# Patient Record
Sex: FEMALE | Race: OTHER | URBAN - METROPOLITAN AREA
[De-identification: names, ages, dates, MRNs, and addresses within clinical notes are randomized per-mention and may not be internally consistent; named-entity substitution may affect disease eponyms.]

---

## 2019-11-01 ENCOUNTER — EMERGENCY (EMERGENCY)
Facility: HOSPITAL | Age: 44
LOS: 1 days | Discharge: HOME | End: 2019-11-01
Admitting: EMERGENCY MEDICINE
Payer: COMMERCIAL

## 2019-11-01 VITALS
OXYGEN SATURATION: 98 % | HEART RATE: 90 BPM | RESPIRATION RATE: 16 BRPM | DIASTOLIC BLOOD PRESSURE: 8 MMHG | HEIGHT: 59 IN | SYSTOLIC BLOOD PRESSURE: 133 MMHG | TEMPERATURE: 98 F | WEIGHT: 179.9 LBS

## 2019-11-01 VITALS
HEART RATE: 87 BPM | OXYGEN SATURATION: 97 % | DIASTOLIC BLOOD PRESSURE: 85 MMHG | SYSTOLIC BLOOD PRESSURE: 128 MMHG | TEMPERATURE: 98 F | RESPIRATION RATE: 17 BRPM

## 2019-11-01 LAB — HCG UR QL: NEGATIVE — SIGNIFICANT CHANGE UP

## 2019-11-01 PROCEDURE — 70487 CT MAXILLOFACIAL W/DYE: CPT | Mod: 26

## 2019-11-01 PROCEDURE — 99284 EMERGENCY DEPT VISIT MOD MDM: CPT

## 2019-11-01 RX ORDER — OFLOXACIN 0.3 %
1 DROPS OPHTHALMIC (EYE) ONCE
Refills: 0 | Status: COMPLETED | OUTPATIENT
Start: 2019-11-01 | End: 2019-11-01

## 2019-11-01 RX ORDER — ALPRAZOLAM 0.25 MG
1 TABLET ORAL ONCE
Refills: 0 | Status: DISCONTINUED | OUTPATIENT
Start: 2019-11-01 | End: 2019-11-01

## 2019-11-01 RX ORDER — VANCOMYCIN HCL 1 G
VIAL (EA) INTRAVENOUS
Refills: 0 | Status: DISCONTINUED | OUTPATIENT
Start: 2019-11-01 | End: 2019-11-01

## 2019-11-01 RX ORDER — VANCOMYCIN HCL 1 G
1750 VIAL (EA) INTRAVENOUS ONCE
Refills: 0 | Status: COMPLETED | OUTPATIENT
Start: 2019-11-01 | End: 2019-11-01

## 2019-11-01 RX ADMIN — Medication 250 MILLIGRAM(S): at 20:38

## 2019-11-01 RX ADMIN — Medication 1 MILLIGRAM(S): at 21:39

## 2019-11-01 NOTE — ED PROVIDER NOTE - PATIENT PORTAL LINK FT
You can access the FollowMyHealth Patient Portal offered by [facility] by registering at the following website: [email or web address]. By joining [employer]’s FollowMyHealth portal, you will also be able to view your health information using other applications (apps) compatible with our system.

## 2019-11-01 NOTE — ED PROVIDER NOTE - OBJECTIVE STATEMENT
45 y/o F with no PMHx presents to the ED for BL eye pain with associated swelling. Pt reports she put on Halloween contact lenses last night and took them off shortly after due to pain. Today, she woke up with BL eye pain described as "a feeling as if something is in the eye" with associated swelling and redness. She went to [facility] and was given Ibuprofen which has provided some relief. Pt was referred to the ED for further evaluation. She also endorses mild blurred vision, photophobia, and a mild subjective fever. Pt states she normally does not wear contact lenses.

## 2019-11-01 NOTE — ED ADULT NURSE NOTE - CHIEF COMPLAINT QUOTE
right eye is irritated after putting old contacts in last night and then took them out. eye is red and irriated.no vision changes. cheek is red too

## 2019-11-01 NOTE — ED PROVIDER NOTE - CARE PROVIDER_API CALL
Miguel May)  Ophthalmology  [address] [address]  Phone: [phone]  Fax: [phone]  Follow Up Time:

## 2019-11-01 NOTE — ED PROVIDER NOTE - PROGRESS NOTE DETAILS
Erythema, warmth and redness completely resolved with decadron and toradol + vanco. Wet read of CT shows no soft tissue infection and no orbital or periorbital cellulitis. Pt requesting to leave at [facility] st[facility]. GIven Vigamox drops. Understands the risks of leaving prior to official CT read is back- states she will come back immediately if there are any concerning findings- otherwise she will follow up with dr. spence in [place]. Will send Rx for clinda

## 2019-11-01 NOTE — ED PROVIDER NOTE - NSFOLLOWUPINSTRUCTIONS_ED_ALL_ED_FT
-PLEASE FOLLOW-UP WITH YOUR PRIMARY CARE DOCTOR IN 1-2 DAYS.  BRING ALL PAPERWORK FROM TODAY'S VISIT TO YOUR FOLLOW-UP VISIT.  IF YOU DO NOT HAVE A PRIMARY CARE DOCTOR PLEASE REFER TO THE OFFICE/CLINIC INFORMATION GIVEN ABOVE.  YOU MAY ALSO CALL [phone] AND ASK FOR MS. EPIFANIOCHANO LEON.  SHE CAN HELP YOU MAKE A FOLLOW-UP APPOINTMENT.  HER HOURS ARE 11AM-7PM MONDAY - FRIDAY.  -TAKE OVER THE COUNTER TYLENOL 650MG BY MOUTH EVERY 4-6 HOURS AS NEEDED FOR PAIN.  DO NOT MIX WITH ALCOHOL OR OTHER PRESCRIPTION MEDICATIONS THAT ALREADY CONTAIN TYLENOL OR ACETAMINOPHEN.   -TAKE OVER THE COUNTER IBUPROFEN 400-600MG BY MOUTH EVERY 8 HOURS AS NEEDED FOR PAIN.  BE SURE TO TAKE WITH FOOD OR MILK AS THIS MEDICATION CAN CAUSE STOMACH IRRITATION.  -PLEASE RETURN TO THE ER IMMEDIATELY OR CALL 911 FOR ANY HIGH FEVER, TROUBLE BREATHING, VOMITING, SEVERE PAIN, OR ANY OTHER CONCERNS.

## 2019-11-01 NOTE — ED PROVIDER NOTE - CLINICAL SUMMARY MEDICAL DECISION MAKING FREE TEXT BOX
Pt sent from  for eye redness and discomfort, photophobia, and surrounding skin with redness and warmth. R/o orbital vs periorbital cellulitis. Unable to get labs - attempted with US. Line placed. Will get CT maxillofacial with IV contrast. Will give IV Vanc. re-eval

## 2019-11-01 NOTE — ED PROVIDER NOTE - PHYSICAL EXAMINATION
VITAL SIGNS: I have reviewed nursing notes and confirm.  CONSTITUTIONAL: Well-developed; well-nourished; in no acute distress.  SKIN: Skin is warm and dry, no acute rash.  HEAD: Normocephalic; atraumatic.  EYES: PERRL, EOM intact; conjunctiva and sclera clear.  ENT: No nasal discharge; airway clear.  NECK: Supple; non tender.  CARD: S1, S2 normal; no murmurs, gallops, or rubs. Regular rate and rhythm.  RESP: No wheezes, rales or rhonchi.  ABD: Normal bowel sounds; soft; non-distended; non-tender.   EXT: Normal ROM. No clubbing, cyanosis or edema.  NEURO: Alert, oriented. Grossly unremarkable.  PSYCH: Cooperative, appropriate. VITAL SIGNS: I have reviewed nursing notes and confirm.  CONSTITUTIONAL: Well-developed; well-nourished; in no acute distress.  SKIN: Skin is warm and dry, no acute rash.  HEAD: Normocephalic; atraumatic.  Left EYE: PERRL, EOM intact; conjunctiva and sclera clear.  R Eye: Pupils are equal, round, reactive to light with extraocular movements intact. Vision is grossly intact.  funduscopic: macula present, no hemorrhage, AV nicking or cupping, optic disc shows cup disc ratio of .3, no flaring or blast cells visualized, no vitreal humor leak  PERRL. EOMI- no pain with movement. No chemosis or hyphema. On fluorescein stain, there are no abrasions or ulcerations visualized, no FB visualized, and no dendritic lesions visualized. IOP of the left eye is 8 and of the right eye is 8. No anisocoria. There are no leukocytes in the anterior chamber on slit lamp exam.  visual acuity: grossly intact, far 20/20 OS, 20/20 OD, 20/20 OU. surrounding skin is erythematous and warm. non-tender. no crepitus.   external: upper/lower lids with mild erythema/edema. NO tenderness  conjunctiva: mildly injected - no corneal abrasion visualized with fluorescein staining-pain not relieved with tetracaine  ENT: No nasal discharge; airway clear.  NECK: Supple; non tender.  CARD: S1, S2 normal; no murmurs, gallops, or rubs. Regular rate and rhythm.  RESP: No wheezes, rales or rhonchi.  ABD: Normal bowel sounds; soft; non-distended; non-tender.   EXT: Normal ROM. No clubbing, cyanosis or edema.  NEURO: Alert, oriented. Grossly unremarkable.  PSYCH: Cooperative, appropriate.

## 2019-11-01 NOTE — ED ADULT TRIAGE NOTE - CHIEF COMPLAINT QUOTE
right eye is irritated after putting old contacts in last night and then took them out. eye is red and irriated. right eye is irritated after putting old contacts in last night and then took them out. eye is red and irriated.no vision changes. cheek is red too

## 2019-11-01 NOTE — ED PROVIDER NOTE - NS ED ROS FT
Other than symptoms associated with present events the following is reported:  General:  Positive for mild subjective fever. No chills, no weight loss.  HEENT:  Positive for Eye pain, swelling, and redness.   Respiratory: No cough, no dyspnea, no wheeze.  Cardiovascular:  No chest pain, no palpitations, no orthopnea.  GI: No abdominal pain, no nausea/vomiting, no diarrhea.  : No dysuria, no frequency, no urgency.  Musculoskeletal:  No joint pain, no myalgia.  Endocrine:  No generalized weakness, no polyuria.  Neurological:  No headache, no focal weakness.   Psychiatric: No emotional stress, no depression.  Derm:  No rash.  Heme:  No bruising, no bleeding.

## 2019-11-02 RX ORDER — MOXIFLOXACIN HCL 0.5 %
2 DROPS OPHTHALMIC (EYE) ONCE
Refills: 0 | Status: COMPLETED | OUTPATIENT
Start: 2019-11-02 | End: 2019-11-02

## 2019-11-02 RX ORDER — DEXAMETHASONE 0.5 MG/5ML
8 ELIXIR ORAL ONCE
Refills: 0 | Status: COMPLETED | OUTPATIENT
Start: 2019-11-02 | End: 2019-11-02

## 2019-11-02 RX ADMIN — Medication 8 MILLIGRAM(S): at 00:17

## 2019-11-02 RX ADMIN — Medication 2 DROP(S): at 00:31

## 2019-11-07 DIAGNOSIS — L03.213 PERIORBITAL CELLULITIS: ICD-10-CM

## 2019-11-07 DIAGNOSIS — H57.89 OTHER SPECIFIED DISORDERS OF EYE AND ADNEXA: ICD-10-CM

## 2021-12-02 NOTE — ED ADULT NURSE NOTE - NS ED NOTE  TALK SOMEONE YN
Anesthesia Pre Eval Note    Anesthesia ROS/Med Hx    Overall Review:  EKG was reviewed     Anesthetic Complication History:  Patient does not have a history of anesthetic complications      End/Other Review:    Positive for cancer  Additional Results:     ALLERGIES:  No Known Allergies       Lab Results       Component                Value               Date                       WBC                      5.1                 10/28/2021                 RBC                      3.96 (L)            10/28/2021                 HGB                      12.7                10/28/2021                 HCT                      38.4                10/28/2021                 MCHC                     33.1                10/28/2021                 CREATININE               0.72                10/28/2021                 GFRESTIMATE              84                  10/28/2021                 PLT                      228                 10/28/2021             Past Medical History:  No date: Malignant neoplasm (CMS/HCC)      Comment:  BCC face  No date: Thyroid condition    Past Surgical History:  12/11/2018: Basal cell carcinoma excision; Left      Comment:  Mohs left upper back  1990's: Breast surgery; Bilateral      Comment:  Bilateral implants  10/02/2020: Colonoscopy      Comment:  Colonoscopy with polypectomy  11/06/2014: Colonoscopy      Comment:  Dr. Dreyer  2006: Eye surgery; Right      Comment:  Lasik  10/2021: Eye surgery; Left      Comment:  Cataract extraction  No date: Fracture surgery; Left      Comment:  arm  10/28/2021: Vitrectomy; Left      Comment:  Dr. Crawford - LEFT PARS PLANA VITRECTOMY, USING 23-GAUGE                INSTRUMENTS, AIR       Prior to Admission medications :  Medication amoxicillin (AMOXIL) 500 MG capsule, Sig Take 500 mg by mouth 3 times daily. Antibiotic course to be completed by 12/02/2021 (per patient)., Start Date , End Date , Taking? Yes, Authorizing Provider Outside Provider    Medication  levothyroxine 75 MCG tablet, Sig Take 75 mcg by mouth., Start Date , End Date , Taking? Yes, Authorizing Provider Outside Provider    Medication gabapentin (NEURONTIN) 300 MG capsule, Sig Take 300 mg by mouth 3 times daily., Start Date , End Date , Taking? Yes, Authorizing Provider Outside Provider    Medication cetirizine (ZyrTEC Allergy) 10 MG tablet, Sig Take 10 mg by mouth as needed for Allergies., Start Date , End Date , Taking? Yes, Authorizing Provider Outside Provider    Medication ibuprofen (MOTRIN) 200 MG tablet, Sig Take 200 mg by mouth every 6 hours as needed for Pain., Start Date , End Date , Taking? Yes, Authorizing Provider Outside Provider    Medication Aspirin-Acetaminophen-Caffeine (EXCEDRIN PO), Sig Take by mouth as needed., Start Date , End Date , Taking? Yes, Authorizing Provider Outside Provider    Medication atropine (ISOPTO ATROPINE) 1 % ophthalmic solution, Sig Place 1 drop into left eye 2 times daily., Start Date 10/28/21, End Date , Taking? , Authorizing Provider Tomas Crawford MD    Medication moxifloxacin (VIGAMOX) 0.5 % ophthalmic solution, Sig Place 1 drop into left eye 4 times daily at 8am, 12pm, 4pm, and 8pm, Start Date 10/28/21, End Date , Taking? , Authorizing Provider Tomas Crawford MD    Medication prednisoLONE acetate (PRED FORTE) 1 % ophthalmic suspension, Sig Place 1 drop into left eye 4 times daily at 8am, 12pm, 4pm, and 8pm, Start Date 10/28/21, End Date , Taking? , Authorizing Provider Tomas Crawford MD    Medication Ophthalmic Irrigation Solution (RA Sterile Eye Wash) Solution, Sig Sterile solution soaked eye pads may be used over closed lids between applications of medicated drops., Start Date 10/28/21, End Date , Taking? , Authorizing Provider Tomas Crawford MD    Medication timolol (TIMOPTIC) 0.25 % ophthalmic solution, Sig Place 1 drop into left eye 2 times daily at 8am and 8pm ., Start Date 10/28/21, End Date , Taking? , Authorizing Provider Tomas Crawford  MD    Medication ondansetron (Zofran ODT) 4 MG disintegrating tablet, Sig Place 1 tablet onto the tongue every 8 hours as needed for Nausea., Start Date 10/28/21, End Date , Taking? , Authorizing Provider Tomas Crawford MD         Patient Vitals in the past 24 hrs:  12/02/21 1044, BP:117/61, Temp:36.6 °C (97.9 °F), Temp src:Temporal, Pulse:74, Resp:18, SpO2:98 %, Height:5' 5\" (1.651 m), Weight:61.7 kg (136 lb 0.4 oz)      Relevant Problems   No relevant active problems       Physical Exam     Airway   Mallampati: II  TM Distance: >3 FB  Neck ROM: Full    Cardiovascular  Cardiovascular exam normal    Head Assessment  Head assessment: Normocephalic    General Assessment  General Assessment: Alert and oriented and No acute distress    Pulmonary Exam  Pulmonary exam normal    Abdominal Exam  Abdominal exam normal      Anesthesia Plan:    ASA Status: 2  Anesthesia Type: Regional    Induction: Intravenous  Preferred Airway Type: MaskPatient does not have a difficult airway or is not at risk of aspiration.   Maintenance: TIVA  Premedication: IV      Post-op Pain Management: Per Surgeon      Checklist  Reviewed: Lab Results, Patient Summary, Care Everywhere, Allergies, Past Med History, Medications, EKG, Nursing Notes, Problem list, NPO Status, Outside Records and Consultations  Consent/Risks Discussed Statement:  The proposed anesthetic plan, including its risks and benefits, have been discussed with the Patient along with the risks and benefits of alternatives. Questions were encouraged and answered and the patient and/or representative understands and agrees to proceed.    I have discussed elements of the patient's history or examination, as noted above and/or as follows, that place the patient at higher risk of complications; age.    I discussed with the patient (and/or patient's legal representative) the risks and benefits of the proposed anesthesia plan, Regional, which may include services performed by other  anesthesia providers.    Alternative anesthesia plans, if available, were reviewed with the patient (and/or patient's legal representative). Discussion has been held with the patient (and/or patient's legal representative) regarding risks of anesthesia, which include allergic reaction, anxiety, aspiration, back pain, bleeding/hematoma, conversion to general anesthesia, death, dental injury, depressed breathing, eye injury, headache, hypotension, vomiting, sore throat, nerve injury, oral injury and nausea and emergent situations that may require change in anesthesia plan.    The patient (and/or patient's legal representative) has indicated understanding, his/her questions have been answered, and he/she wishes to proceed with the planned anesthetic.      Blood Products: Not Anticipated     No